# Patient Record
Sex: MALE | Race: WHITE | NOT HISPANIC OR LATINO | URBAN - METROPOLITAN AREA
[De-identification: names, ages, dates, MRNs, and addresses within clinical notes are randomized per-mention and may not be internally consistent; named-entity substitution may affect disease eponyms.]

---

## 2019-11-26 NOTE — PATIENT DISCUSSION
"""Noted arterial narrowing OS>OD. Will like to reasses VF 24-2.  Patient states he's had Carotid ""

## 2020-05-06 NOTE — PATIENT DISCUSSION
"""IOP stable with current drop therapy. Will plan for Hydrus at time of cataract surgery. "" ""Continue Cosopt left eye twice a day ."" ""Continue Latanoprost both eyes at bedtime ."""

## 2020-11-13 NOTE — PATIENT DISCUSSION
"""Droopy lids bothersome to patient however patient elects to hold on intervention at this time.  ""

## 2021-02-19 NOTE — PATIENT DISCUSSION
"""Educated patient on today's findings. Resolved internal hordeolum with Keflex.  Educated patient ""

## 2021-03-10 NOTE — PATIENT DISCUSSION
"""IOP stable with current drop therapy.  Testing review with patient today. "" ""Continue Cosopt left eye twice a day ."" ""Continue Latanoprost both eyes at bedtime ."""

## 2021-09-16 NOTE — PATIENT DISCUSSION
"""IOP stable with current drop therapy.  Testing review with patient today. "" ""Continue Cosopt left eye twice a day ."" ""Continue Latanoprost both eyes at bedtime .""."

## 2021-09-22 NOTE — PATIENT DISCUSSION
"Droopy lids bothersome to patient however patient elects to hold on intervention at this time. Patient okay to schedule Psosis VF/Photos at his convenience, preferably after cataract surgery. ""."

## 2021-09-22 NOTE — PATIENT DISCUSSION
Artificial Tears: One drop to both eyes 4-6 x times daily. We recommend Systane or Refresh Preservative free lubricating eye drops which can be found at any pharmacy(samples dispensed).  Rec using the [facility] pt is currently using QHS OU not just OS, pt can D/c Klarity and Lorena drop at this time to simplify.  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  Will refer to [facility] after 2 week check unless symptoms improve. IOP slightly elevated on exam today. Recommend Hydrus with Canaloplasty OU at time of cataract surgery. Patient agrees.

## 2021-09-28 NOTE — PATIENT DISCUSSION
IOP slightly elevated on exam today. Recommend Hydrus with Canaloplasty OU at time of cataract surgery. Patient agrees.

## 2021-10-05 NOTE — PATIENT DISCUSSION
CATARACT SURGERY PLANNER - STANDARD IOL/NO FEMTO/MIGS: Phacoemulsification with IOL: Eye: OS|DOS: 10/21/2021|Model: DIBOO|Power: 20. 0|Target: PLANO|MIGS: HYDRUS WITH CANALOPLASTY|Visc: DUET|Omidria: YES|10% Phenylephrine: YES|Epi-shugarcaine: YES|Phaco Setting: STD|Notes: PLAN: DIBOO @ PLANO OU +HYDRUS WITH CANALOPLASTY OU. HX. FAINT ERM OD; SUBTLE RPE CHANGES OU; MILD ATROPHY OS; MILD POAG OU; PTERYGIUM OS. DILATED PUPIL: 7MM.

## 2021-10-27 NOTE — PATIENT DISCUSSION
CATARACT SURGERY PLANNER - STANDARD IOL/+FEMTO/MIGS: Phacoemulsification with IOL: Eye: OD|DOS: 11/4/2021|Model: DIBOO|Power: 20. 0|Target: PLANO|Femto: YES|Arcs: 30° @ 30° ; 30° @ 210°|MIGS: HYDRUS WITH CANALOPLASTY|Visc: YES|Omidria: YES|10% Phenylephrine: YES|Epi-shugarcaine: YES|Phaco Setting: STD|BSS+: NO|Trypan Blue: NO|CTR: NO|Olive Tip: NO|Atropine: NO|Pupilloplasty: NO|Notes: PLAN: EYEHANCE @ PLANO OU AND HYDRUS W/ CANALOPLSTY OU. HX: FAINT ERM OD; SUBTLE RPE CHANGES OU; MILD ATROPHY OS; MILD POAG OU; PTERYGIUM OS. DILATED PUPIL: 7MM.

## 2021-12-10 NOTE — PATIENT DISCUSSION
Longstanding macular atrophy. Patient denies history of trauma and does not know when the decrease in vision OS began. Has been told by other doctors he has experienced atrophy of the left eye. Suspect there was a vascular event. Appears stable on imaging. Will continue to monitor.

## 2021-12-10 NOTE — PATIENT DISCUSSION
Fleshy lesion at lower lash line (peculiar blood vessels) with mild madarosis. Suspect BCC. Will send to Dr. Mimi Winston for possible excision and biopsy.

## 2021-12-10 NOTE — PATIENT DISCUSSION
Instructed to call immediately if any new distortion, blurring, or decreased vision. OCT testing reviewed with patient today.

## 2022-02-03 NOTE — PATIENT DISCUSSION
D/w patient lesion left lower lid does not look cancerous secondary to positive hair follicles. D/w patient we will give him the choice to monitor lesion and if it grows, bleeds or changes color we would take a biopsy or we can do a biopsy in the office and send to pathology to rule out cancer. Patient would like to proceed with biopsy of lesion left lower lid with pathology.  D/w patient if it comes back cancerous we will do full excision at [facility] with mobile pathologist.

## 2022-03-09 ENCOUNTER — COMPREHENSIVE EXAM (OUTPATIENT)
Dept: URBAN - METROPOLITAN AREA CLINIC 17 | Facility: CLINIC | Age: 62
End: 2022-03-09

## 2022-03-09 DIAGNOSIS — H04.123: ICD-10-CM

## 2022-03-09 DIAGNOSIS — H25.13: ICD-10-CM

## 2022-03-09 DIAGNOSIS — H43.813: ICD-10-CM

## 2022-03-09 PROCEDURE — 92015 DETERMINE REFRACTIVE STATE: CPT

## 2022-03-09 PROCEDURE — 92014 COMPRE OPH EXAM EST PT 1/>: CPT

## 2022-03-09 ASSESSMENT — VISUAL ACUITY
OD_PH: 20/30
OD_SC: 20/70
OS_SC: 20/30

## 2022-03-09 ASSESSMENT — TONOMETRY
OD_IOP_MMHG: 13
OS_IOP_MMHG: 13

## 2022-03-21 NOTE — PATIENT DISCUSSION
See Dx 1. Complex Repair And Flap Additional Text (Will Appearing After The Standard Complex Repair Text): The complex repair was not sufficient to completely close the primary defect. The remaining additional defect was repaired with the flap mentioned below.

## 2022-03-22 NOTE — PATIENT DISCUSSION
Droopy lids bothersome to patient however patient elects to hold on intervention at this time. Patient okay to schedule Ptosis VF/Photos at his convenience, preferably after cataract surgery.

## 2022-03-22 NOTE — PATIENT DISCUSSION
Will order HVF 24-2 ROBERTO standard and OCT (RNFL) prior to next visit. Will see patient back in 6 months for IOP check.

## 2022-03-24 NOTE — PROCEDURE NOTE: CLINICAL
PROCEDURE NOTE: Excisional Biopsy of Eyelid Lesion, Including Lid Margin, Tarsus and/or Conjunctiva #1 Left Lower Lid.

## 2022-04-15 NOTE — PATIENT DISCUSSION
Droopy lids bothersome to patient however patient elects to hold on intervention at this time. Patient okay to schedule Ptosis VF/Photos at his convenience, preferably after cataract surgery. Treatment Number: 0 Detail Level: Zone Consent: Prior to the procedure, written consent was obtained and risks were reviewed, including but not limited to: redness, peeling, blistering, pigmentary change, scarring, infection, and pain. Number Of Coats: 3 Chemical Peel: 10% TCA Prep: The treated area was degreased with pre-peel cleanser, and vaseline was applied for protection of mucous membranes. Post Peel Care: After the procedure, the treatment area was washed with soap and water, and a post-peel cream was applied. Sun protection and post-care instructions were reviewed with the patient. Chemical peel done by KEREN. Post-Care Instructions: I reviewed with the patient in detail post-care instructions. Patient should avoid sun exposure and wear sun protection.

## 2022-04-15 NOTE — PATIENT DISCUSSION
Discussed with patient that it is a possibility of re-growth. If so may have to excise a larger piece of lower lid. Will follow up in 1 month for re-evaluation. Copy of results given to patient.

## 2022-04-15 NOTE — PATIENT DISCUSSION
Excision of LLL done 03/24/2022. Results reviewed with patient in office today. 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

## 2022-07-07 NOTE — PATIENT DISCUSSION
Retinal tear and detachment warning symptoms reviewed and patient instructed to call immediately if increasing floaters, flashes, or decreasing peripheral vision. No-Patient/Caregiver offered and refused free interpretation services.

## 2022-09-20 NOTE — PATIENT DISCUSSION
Due to location patient wishes to stay closer to home. Would like to switch to Dr. Brian Méndez as she goes to [facility].

## 2022-09-20 NOTE — PROCEDURE NOTE: CLINICAL
PROCEDURE NOTE: Epilation Left Upper Lid. Diagnosis: Trichiasis without Entropion. Consent was obtained prior to the procedure. Patient was brought to slit lamp where trichiasis was/were removed using micro forceps. Patient tolerated procedure well. Elly Perkins

## 2023-04-05 ENCOUNTER — ESTABLISHED PATIENT (OUTPATIENT)
Dept: URBAN - METROPOLITAN AREA CLINIC 17 | Facility: CLINIC | Age: 63
End: 2023-04-05

## 2023-04-05 DIAGNOSIS — H43.813: ICD-10-CM

## 2023-04-05 DIAGNOSIS — H04.123: ICD-10-CM

## 2023-04-05 DIAGNOSIS — H17.9: ICD-10-CM

## 2023-04-05 DIAGNOSIS — H25.13: ICD-10-CM

## 2023-04-05 DIAGNOSIS — H35.373: ICD-10-CM

## 2023-04-05 PROCEDURE — 99214 OFFICE O/P EST MOD 30 MIN: CPT

## 2023-04-05 PROCEDURE — 92134 CPTRZ OPH DX IMG PST SGM RTA: CPT

## 2023-04-05 ASSESSMENT — VISUAL ACUITY
OD_CC: 20/60
OS_CC: 20/30-2

## 2023-04-05 ASSESSMENT — TONOMETRY
OS_IOP_MMHG: 15
OD_IOP_MMHG: 17

## 2023-04-12 NOTE — PATIENT DISCUSSION
Instructed to call immediately if any new distortion, blurring, or decreased vision. OCT testing reviewed with patient today. Treat and Release

## 2024-08-19 ENCOUNTER — COMPREHENSIVE EXAM (OUTPATIENT)
Dept: URBAN - METROPOLITAN AREA CLINIC 17 | Facility: CLINIC | Age: 64
End: 2024-08-19

## 2024-08-19 DIAGNOSIS — H04.123: ICD-10-CM

## 2024-08-19 DIAGNOSIS — H43.813: ICD-10-CM

## 2024-08-19 DIAGNOSIS — H35.373: ICD-10-CM

## 2024-08-19 DIAGNOSIS — H25.13: ICD-10-CM

## 2024-08-19 DIAGNOSIS — H17.9: ICD-10-CM

## 2024-08-19 PROCEDURE — 92015 DETERMINE REFRACTIVE STATE: CPT

## 2024-08-19 PROCEDURE — 92134 CPTRZ OPH DX IMG PST SGM RTA: CPT

## 2024-08-19 PROCEDURE — 92014 COMPRE OPH EXAM EST PT 1/>: CPT

## 2024-08-19 ASSESSMENT — VISUAL ACUITY
OD_CC: 20/40
OD_BCVA: 20/25
OS_BCVA: 20/25
OD_PH: 20/30
OS_CC: 20/25

## 2024-08-19 ASSESSMENT — TONOMETRY
OD_IOP_MMHG: 20
OS_IOP_MMHG: 21

## 2025-08-20 ENCOUNTER — COMPREHENSIVE EXAM (OUTPATIENT)
Age: 65
End: 2025-08-20

## 2025-08-20 DIAGNOSIS — H35.373: ICD-10-CM

## 2025-08-20 DIAGNOSIS — D48.5: ICD-10-CM

## 2025-08-20 DIAGNOSIS — H04.123: ICD-10-CM

## 2025-08-20 DIAGNOSIS — H43.813: ICD-10-CM

## 2025-08-20 DIAGNOSIS — H25.13: ICD-10-CM

## 2025-08-20 PROCEDURE — 92015 DETERMINE REFRACTIVE STATE: CPT

## 2025-08-20 PROCEDURE — 92134 CPTRZ OPH DX IMG PST SGM RTA: CPT

## 2025-08-20 PROCEDURE — 99214 OFFICE O/P EST MOD 30 MIN: CPT
